# Patient Record
Sex: FEMALE | NOT HISPANIC OR LATINO
[De-identification: names, ages, dates, MRNs, and addresses within clinical notes are randomized per-mention and may not be internally consistent; named-entity substitution may affect disease eponyms.]

---

## 2020-12-21 PROBLEM — Z00.00 ENCOUNTER FOR PREVENTIVE HEALTH EXAMINATION: Status: ACTIVE | Noted: 2020-12-21

## 2020-12-28 ENCOUNTER — RESULT REVIEW (OUTPATIENT)
Age: 50
End: 2020-12-28

## 2020-12-28 ENCOUNTER — APPOINTMENT (OUTPATIENT)
Dept: HEMATOLOGY ONCOLOGY | Facility: CLINIC | Age: 50
End: 2020-12-28
Payer: COMMERCIAL

## 2020-12-28 VITALS
SYSTOLIC BLOOD PRESSURE: 114 MMHG | BODY MASS INDEX: 20.91 KG/M2 | HEART RATE: 16 BPM | OXYGEN SATURATION: 100 % | WEIGHT: 117.99 LBS | HEIGHT: 62.8 IN | RESPIRATION RATE: 16 BRPM | DIASTOLIC BLOOD PRESSURE: 72 MMHG | TEMPERATURE: 98.1 F

## 2020-12-28 DIAGNOSIS — Z78.9 OTHER SPECIFIED HEALTH STATUS: ICD-10-CM

## 2020-12-28 DIAGNOSIS — Z87.891 PERSONAL HISTORY OF NICOTINE DEPENDENCE: ICD-10-CM

## 2020-12-28 PROCEDURE — 99072 ADDL SUPL MATRL&STAF TM PHE: CPT

## 2020-12-28 PROCEDURE — 99205 OFFICE O/P NEW HI 60 MIN: CPT

## 2020-12-28 RX ORDER — CHOLECALCIFEROL (VITAMIN D3) 50 MCG
2000 CAPSULE ORAL
Refills: 0 | Status: ACTIVE | COMMUNITY

## 2020-12-28 RX ORDER — ZINC SULFATE 50(220)MG
50 CAPSULE ORAL
Refills: 0 | Status: ACTIVE | COMMUNITY

## 2020-12-28 NOTE — CONSULT LETTER
[Dear  ___] : Dear  [unfilled], [Consult Letter:] : I had the pleasure of evaluating your patient, [unfilled]. [Please see my note below.] : Please see my note below. [Consult Closing:] : Thank you very much for allowing me to participate in the care of this patient.  If you have any questions, please do not hesitate to contact me. [Sincerely,] : Sincerely, [FreeTextEntry3] : Leonila Rodriguez MD\par [facility] [place] at [facility]\par

## 2020-12-28 NOTE — HISTORY OF PRESENT ILLNESS
[de-identified] : 50 year old female presents today for initial consultation of thrombocytosis, referred by Dr. Umaña.  Platelets dated 10/16/2020 were 619k with a normal H/H.   Follow up labs showed a platelet count of 554.  Patient denies any medical problems or PMH- she is feeling well.  She still has her periods with moderate blood loss.  She denies any potential inflammatory process, no joint pain, rashes, fevers.

## 2020-12-28 NOTE — ASSESSMENT
[FreeTextEntry1] : 51 yo female referred by Dr. Yvette Umaña for evaluation of thrombocythemia.  Patient with elevated platelets count for the last two years with platelets count 561k today.\par I reviewed with patient differential diagnosis of secondary thrombocythemia related to blood loss or inflammatory  process vs essential thrombocythemia.  \par \par Labs were send out for ferritin, ESR, FRANC-2, CALR and MPL mutations.  She was explained that bone marrow biopsy might be needed to complete diagnostic process. \par \par Shanell will follow up in 2 weeks.\par \par Labs next visit: CBC, LDH.

## 2021-02-01 ENCOUNTER — APPOINTMENT (OUTPATIENT)
Dept: HEMATOLOGY ONCOLOGY | Facility: CLINIC | Age: 51
End: 2021-02-01
Payer: COMMERCIAL

## 2021-02-04 ENCOUNTER — APPOINTMENT (OUTPATIENT)
Dept: HEMATOLOGY ONCOLOGY | Facility: CLINIC | Age: 51
End: 2021-02-04
Payer: COMMERCIAL

## 2021-02-04 PROCEDURE — 99214 OFFICE O/P EST MOD 30 MIN: CPT | Mod: 95

## 2021-02-08 NOTE — HISTORY OF PRESENT ILLNESS
[Home] : at home, [unfilled] , at the time of the visit. [Medical Office: ([facility])___] : at the medical office located in  [Verbal consent obtained from patient] : the patient, [unfilled] [de-identified] : 50 year old female presents today for initial consultation of thrombocytosis, referred by Dr. Umaña.  Platelets dated 10/16/2020 were 619k with a normal H/H.   Follow up labs showed a platelet count of 554.  Patient denies any medical problems or PMH- she is feeling well.  She still has her periods with moderate blood loss.  She denies any potential inflammatory process, no joint pain, rashes, fevers.   [de-identified] : Patient is following up to review lab work related to thrombocytosis. Patient denies any physical complaints.

## 2021-02-08 NOTE — CONSULT LETTER
[FreeTextEntry3] : Leonila Rodriguez MD\par [facility] [place] at [facility]\par

## 2021-02-08 NOTE — ASSESSMENT
No [FreeTextEntry1] : 51 yo female referred by Dr. Yvette Umaña for evaluation of thrombocythemia.  Patient with elevated platelets count for the last two years with platelets count 561k today.\par I reviewed with patient differential diagnosis of secondary thrombocythemia related to blood loss or inflammatory  process vs essential thrombocythemia.  \par \par Labs were send out for ferritin, ESR, FRANC-2, CALR and MPL mutations.  She was explained that bone marrow biopsy might be needed to complete diagnostic process. \par \par Labs next visit: CBC, LDH.\par \par 2/4/2021\par Telehealth with patient today.\par \par The results of laboratory testing revealed FRANC-2 V16F 6%.  \par \par Patient with low risk disease given JAK2 positivity, age < 60 and no history of thrombosis. \par \par Reviewed with patient pathobiology of ET, MPD disease, with presentation of thrombocythemia.  SHe is healthy 51 yo, with no history of HTN, DM, CAD or thrombosis and she is a candidate for surveillance.  SHe will start ASA 81 mg.\par Explained complications of ET including thrombosis, CVA and correlation with bone marrow fibrosis.\par \par \par

## 2021-04-14 ENCOUNTER — APPOINTMENT (OUTPATIENT)
Dept: HEMATOLOGY ONCOLOGY | Facility: CLINIC | Age: 51
End: 2021-04-14

## 2021-05-10 ENCOUNTER — APPOINTMENT (OUTPATIENT)
Dept: HEMATOLOGY ONCOLOGY | Facility: CLINIC | Age: 51
End: 2021-05-10
Payer: COMMERCIAL

## 2021-06-21 NOTE — ASSESSMENT
[FreeTextEntry1] : 51 yo female referred by Dr. Yvette Umaña for evaluation of thrombocythemia.  Patient with elevated platelets count for the last two years with platelets count 561k today.\par I reviewed with patient differential diagnosis of secondary thrombocythemia related to blood loss or inflammatory  process vs essential thrombocythemia.  \par \par Labs were send out for ferritin, ESR, FRANC-2, CALR and MPL mutations.  She was explained that bone marrow biopsy might be needed to complete diagnostic process. \par \par Labs next visit: CBC, LDH.\par \par 2/4/2021\par Telehealth with patient today.\par \par The results of laboratory testing revealed FRANC-2 V16F 6%.  \par \par Patient with low risk disease given JAK2 positivity, age < 60 and no history of thrombosis. \par \par Reviewed with patient pathobiology of ET, MPD disease, with presentation of thrombocythemia.  SHe is healthy 51 yo, with no history of HTN, DM, CAD or thrombosis and she is a candidate for surveillance.  SHe will start ASA 81 mg.\par Explained complications of ET including thrombosis, CVA and correlation with bone marrow fibrosis.\par \par \par

## 2021-06-21 NOTE — CONSULT LETTER
[FreeTextEntry3] : Leonila Rodriguez MD\par [facility] [place] at [facility]\par

## 2021-06-21 NOTE — HISTORY OF PRESENT ILLNESS
[de-identified] : 50 year old female presents today for initial consultation of thrombocytosis, referred by Dr. Umaña.  Platelets dated 10/16/2020 were 619k with a normal H/H.   Follow up labs showed a platelet count of 554.  Patient denies any medical problems or PMH- she is feeling well.  She still has her periods with moderate blood loss.  She denies any potential inflammatory process, no joint pain, rashes, fevers.   [de-identified] : Patient is following up to review lab work related to thrombocytosis. Patient denies any physical complaints.

## 2021-06-28 ENCOUNTER — APPOINTMENT (OUTPATIENT)
Dept: HEMATOLOGY ONCOLOGY | Facility: CLINIC | Age: 51
End: 2021-06-28
Payer: COMMERCIAL

## 2021-08-02 ENCOUNTER — RESULT REVIEW (OUTPATIENT)
Age: 51
End: 2021-08-02

## 2021-08-02 ENCOUNTER — APPOINTMENT (OUTPATIENT)
Dept: HEMATOLOGY ONCOLOGY | Facility: CLINIC | Age: 51
End: 2021-08-02
Payer: COMMERCIAL

## 2021-08-02 VITALS
WEIGHT: 124.3 LBS | OXYGEN SATURATION: 100 % | TEMPERATURE: 97.6 F | HEIGHT: 62.8 IN | DIASTOLIC BLOOD PRESSURE: 69 MMHG | BODY MASS INDEX: 22.02 KG/M2 | RESPIRATION RATE: 16 BRPM | SYSTOLIC BLOOD PRESSURE: 101 MMHG | HEART RATE: 61 BPM

## 2021-08-02 PROCEDURE — 99214 OFFICE O/P EST MOD 30 MIN: CPT

## 2021-08-02 RX ORDER — PARSLEY 450 MG
500 CAPSULE ORAL
Refills: 0 | Status: ACTIVE | COMMUNITY

## 2021-08-02 RX ORDER — GINKGO BILOBA 60 MG
TABLET ORAL
Refills: 0 | Status: ACTIVE | COMMUNITY

## 2021-08-02 RX ORDER — CALCIUM CITRATE/VITAMIN D3 500-12.5/5
500-500 POWDER IN PACKET (EA) ORAL
Refills: 0 | Status: ACTIVE | COMMUNITY

## 2021-08-02 RX ORDER — CYANOCOBALAMIN (VITAMIN B-12) 1000 MCG
TABLET ORAL
Refills: 0 | Status: ACTIVE | COMMUNITY

## 2021-08-02 NOTE — HISTORY OF PRESENT ILLNESS
[Home] : at home, [unfilled] , at the time of the visit. [Medical Office: ([facility])___] : at the medical office located in  [Verbal consent obtained from patient] : the patient, [unfilled] [de-identified] : 50 year old female presents today for initial consultation of thrombocytosis, referred by Dr. Umaña.  Platelets dated 10/16/2020 were 619k with a normal H/H.   Follow up labs showed a platelet count of 554.  Patient denies any medical problems or PMH- she is feeling well.  She still has her periods with moderate blood loss.  She denies any potential inflammatory process, no joint pain, rashes, fevers.   [de-identified] : Patient is following up to review lab work related to thrombocytosis. Patient denies any physical complaints. Se did not start ASA- she started Ginko instead

## 2021-08-02 NOTE — ASSESSMENT
[FreeTextEntry1] : 52 yo female referred by Dr. Yvette Umaña for evaluation of thrombocythemia.  Patient with elevated platelets count for the last two years with platelets count 561k today.\par I reviewed with patient differential diagnosis of secondary thrombocythemia related to blood loss or inflammatory  process vs essential thrombocythemia.  \par \par Labs were send out for ferritin, ESR, FRANC-2, CALR and MPL mutations.  She was explained that bone marrow biopsy might be needed to complete diagnostic process. \par \par Labs next visit: CBC, LDH.\par \par 2/4/2021\par Telehealth with patient today.\par \par The results of laboratory testing revealed FRANC-2 V16F 6%.  \par \par Patient with low risk disease given JAK2 positivity, age < 60 and no history of thrombosis. \par \par Reviewed with patient pathobiology of ET, MPD disease, with presentation of thrombocythemia.  SHe is healthy 49 yo, with no history of HTN, DM, CAD or thrombosis and she is a candidate for surveillance.  SHe will start ASA 81 mg.\par Explained complications of ET including thrombosis, CVA and correlation with bone marrow fibrosis.\par \par 8/2/2021\par JAK2 positive ET - platelets 522k\par She is taking Ginko instead of ASA- explained no study to compare ASA to Ginko\par Continue surveillance \par Didnt get vaccine for COVID\par RTC 6 months\par \par \par

## 2021-08-02 NOTE — CONSULT LETTER
[Dear  ___] : Dear  [unfilled], [Consult Letter:] : I had the pleasure of evaluating your patient, [unfilled]. [Please see my note below.] : Please see my note below. [Consult Closing:] : Thank you very much for allowing me to participate in the care of this patient.  If you have any questions, please do not hesitate to contact me. [Sincerely,] : Sincerely, [FreeTextEntry3] : Leonila Rodriguez MD\par [facility] [place] at [facility]\par

## 2022-02-07 ENCOUNTER — APPOINTMENT (OUTPATIENT)
Dept: HEMATOLOGY ONCOLOGY | Facility: CLINIC | Age: 52
End: 2022-02-07
Payer: COMMERCIAL

## 2022-06-06 ENCOUNTER — RESULT REVIEW (OUTPATIENT)
Age: 52
End: 2022-06-06

## 2022-06-06 ENCOUNTER — APPOINTMENT (OUTPATIENT)
Dept: HEMATOLOGY ONCOLOGY | Facility: CLINIC | Age: 52
End: 2022-06-06
Payer: COMMERCIAL

## 2022-06-06 VITALS
DIASTOLIC BLOOD PRESSURE: 72 MMHG | SYSTOLIC BLOOD PRESSURE: 121 MMHG | TEMPERATURE: 97.6 F | HEART RATE: 57 BPM | RESPIRATION RATE: 16 BRPM | OXYGEN SATURATION: 99 % | BODY MASS INDEX: 22.4 KG/M2 | WEIGHT: 126.44 LBS | HEIGHT: 62.8 IN

## 2022-06-06 PROCEDURE — 36415 COLL VENOUS BLD VENIPUNCTURE: CPT

## 2022-06-06 PROCEDURE — 99213 OFFICE O/P EST LOW 20 MIN: CPT | Mod: 25

## 2022-06-06 NOTE — CONSULT LETTER
[FreeTextEntry3] : Leonila Rodriguez MD\par [facility] [place] at [facility]\par

## 2022-06-06 NOTE — ASSESSMENT
[FreeTextEntry1] : 50 yo female referred by Dr. Yvette Umaña for evaluation of thrombocythemia.  Patient with elevated platelets count for the last two years with platelets count 561k today.\par I reviewed with patient differential diagnosis of secondary thrombocythemia related to blood loss or inflammatory  process vs essential thrombocythemia.  \par \par Labs were send out for ferritin, ESR, FRANC-2, CALR and MPL mutations.  She was explained that bone marrow biopsy might be needed to complete diagnostic process. \par \par Labs next visit: CBC, LDH.\par \par The results of laboratory testing revealed FRANC-2 V16F 6%.  \par \par Patient with low risk disease given JAK2 positivity, age < 60 and no history of thrombosis. \par \par Reviewed with patient pathobiology of ET, MPD disease, with presentation of thrombocythemia.  SHe is healthy 51 yo, with no history of HTN, DM, CAD or thrombosis and she is a candidate for surveillance.  mg.\par Explained complications of ET including thrombosis, CVA and correlation with bone marrow fibrosis.\par \par JAK2 positive ET - platelets 522k\par She is taking Ginkgo instead of ASA- explained no study to compare ASA to Ginkgo\par  \par Platelets 513k, doesn’t take ASA, takes Ginkgo, for memory.  \par Not COVID vaccinated, declined.\par Encouraged to take ASA. \par \par C/o hand swelling - left hand, pinky pain, works on the computer.  Follow up with Dr. Umaña\par \par \par \par \par \par

## 2022-06-06 NOTE — HISTORY OF PRESENT ILLNESS
[de-identified] : 50 year old female presents today for initial consultation of thrombocytosis, referred by Dr. Umaña.  Platelets dated 10/16/2020 were 619k with a normal H/H.   Follow up labs showed a platelet count of 554.  Patient denies any medical problems or PMH- she is feeling well.  She still has her periods with moderate blood loss.  She denies any potential inflammatory process, no joint pain, rashes, fevers.   [de-identified] : Patient is following up to review lab work related to thrombocytosis. Patient denies any physical complaints.

## 2022-12-05 ENCOUNTER — APPOINTMENT (OUTPATIENT)
Dept: HEMATOLOGY ONCOLOGY | Facility: CLINIC | Age: 52
End: 2022-12-05
Payer: COMMERCIAL

## 2023-06-06 ENCOUNTER — APPOINTMENT (OUTPATIENT)
Dept: HEMATOLOGY ONCOLOGY | Facility: CLINIC | Age: 53
End: 2023-06-06
Payer: COMMERCIAL

## 2023-06-06 ENCOUNTER — RESULT REVIEW (OUTPATIENT)
Age: 53
End: 2023-06-06

## 2023-06-06 VITALS
DIASTOLIC BLOOD PRESSURE: 80 MMHG | BODY MASS INDEX: 22.02 KG/M2 | HEART RATE: 67 BPM | WEIGHT: 124.25 LBS | OXYGEN SATURATION: 99 % | SYSTOLIC BLOOD PRESSURE: 120 MMHG | TEMPERATURE: 97.3 F | HEIGHT: 62.8 IN | RESPIRATION RATE: 16 BRPM
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
## 2023-06-06 PROCEDURE — 36415 COLL VENOUS BLD VENIPUNCTURE: CPT

## 2023-06-06 PROCEDURE — 99214 OFFICE O/P EST MOD 30 MIN: CPT | Mod: 25

## 2023-06-06 NOTE — HISTORY OF PRESENT ILLNESS
[de-identified] : 50 year old female presents today for initial consultation of thrombocytosis, referred by Dr. Umaña.  Platelets dated 10/16/2020 were 619k with a normal H/H.   Follow up labs showed a platelet count of 554.  Patient denies any medical problems or PMH- she is feeling well.  She still has her periods with moderate blood loss.  She denies any potential inflammatory process, no joint pain, rashes, fevers.   [de-identified] : Patient is following up to review lab work related to thrombocytosis. Patient denies any physical complaints.

## 2023-06-06 NOTE — ASSESSMENT
[FreeTextEntry1] : 53 yo female referred by Dr. Yvette Umaña for evaluation of thrombocythemia.  Patient with elevated platelets count for the last two years with platelets count 561k today.\par I reviewed with patient differential diagnosis of secondary thrombocythemia related to blood loss or inflammatory  process vs essential thrombocythemia.  \par \par Labs were send out for ferritin, ESR, FRANC-2, CALR and MPL mutations.  She was explained that bone marrow biopsy might be needed to complete diagnostic process. \par \par Labs next visit: CBC, LDH.\par \par The results of laboratory testing revealed FRANC-2 V16F 6%.  \par \par Patient with low risk disease given JAK2 positivity, age < 60 and no history of thrombosis. \par \par Reviewed with patient pathobiology of ET, MPD disease, with presentation of thrombocythemia.  SHe is healthy 53 yo, with no history of HTN, DM, CAD or thrombosis and she is a candidate for surveillance.  \par Explained complications of ET including thrombosis, CVA and correlation with bone marrow fibrosis.\par \par JAK2 positive ET \par She is taking Ginkgo instead of ASA- explained no study to compare ASA to Ginkgo\par  \par Platelets 448k, doesn’t take ASA, takes Ginkgo, for memory.  \par Not COVID vaccinated, declined.\par Encouraged to take ASA. \par \par \par \par \par \par \par \par

## 2023-06-06 NOTE — CONSULT LETTER
[FreeTextEntry3] : Leonila Rodriguez MD\par [facility] [place] at [facility]\par

## 2024-06-13 ENCOUNTER — RESULT REVIEW (OUTPATIENT)
Age: 54
End: 2024-06-13

## 2024-06-13 ENCOUNTER — APPOINTMENT (OUTPATIENT)
Dept: HEMATOLOGY ONCOLOGY | Facility: CLINIC | Age: 54
End: 2024-06-13
Payer: COMMERCIAL

## 2024-06-13 VITALS
SYSTOLIC BLOOD PRESSURE: 111 MMHG | WEIGHT: 117.44 LBS | RESPIRATION RATE: 16 BRPM | DIASTOLIC BLOOD PRESSURE: 74 MMHG | HEART RATE: 73 BPM | HEIGHT: 62.8 IN | BODY MASS INDEX: 20.81 KG/M2 | TEMPERATURE: 96.5 F | OXYGEN SATURATION: 99 %

## 2024-06-13 DIAGNOSIS — D75.839 THROMBOCYTOSIS, UNSPECIFIED: ICD-10-CM

## 2024-06-13 PROCEDURE — 99213 OFFICE O/P EST LOW 20 MIN: CPT | Mod: 25

## 2024-06-13 PROCEDURE — 36415 COLL VENOUS BLD VENIPUNCTURE: CPT

## 2024-06-13 NOTE — ASSESSMENT
[FreeTextEntry1] : 52 yo female referred by Dr. Yvette Umaña for evaluation of thrombocythemia.  Patient with elevated platelets count for the last two years with platelets count 561k today. I reviewed with patient differential diagnosis of secondary thrombocythemia related to blood loss or inflammatory  process vs essential thrombocythemia.    Labs were send out for ferritin, ESR, FRANC-2, CALR and MPL mutations.  She was explained that bone marrow biopsy might be needed to complete diagnostic process.   Labs next visit: CBC, LDH.  The results of laboratory testing revealed FRANC-2 V16F 6%.    Patient with low risk disease given JAK2 positivity, age < 60 and no history of thrombosis.   Reviewed with patient pathobiology of ET, MPD disease, with presentation of thrombocythemia.  SHe is healthy 53 yo, with no history of HTN, DM, CAD or thrombosis and she is a candidate for surveillance.   Explained complications of ET including thrombosis, CVA and correlation with bone marrow fibrosis.  JAK2 positive ET  She is taking Ginkgo instead of ASA- explained no study to compare ASA to Ginkgo   Platelets 458k, doesn't take ASA, takes Ginkgo, for memory.   Not COVID vaccinated, declined. Encouraged to take ASA.   Case and mgmt discussed with Dr. Rodriguez- return in 1 year- reg labs and irons

## 2024-06-13 NOTE — HISTORY OF PRESENT ILLNESS
[de-identified] : 50 year old female presents today for initial consultation of thrombocytosis, referred by Dr. Umaña.  Platelets dated 10/16/2020 were 619k with a normal H/H.   Follow up labs showed a platelet count of 554.  Patient denies any medical problems or PMH- she is feeling well.  She still has her periods with moderate blood loss.  She denies any potential inflammatory process, no joint pain, rashes, fevers.   [de-identified] : Patient is following up to review lab work related to thrombocytosis. Patient has increased UTI's has f/u apt after today's visit.

## 2024-06-13 NOTE — CONSULT LETTER
[Dear  ___] : Dear  [unfilled], [Consult Letter:] : I had the pleasure of evaluating your patient, [unfilled]. [Please see my note below.] : Please see my note below. [Consult Closing:] : Thank you very much for allowing me to participate in the care of this patient.  If you have any questions, please do not hesitate to contact me. [Sincerely,] : Sincerely, [FreeTextEntry3] : Leonila Rodriguez MD\par  [facility] [place] at [facility]\par

## 2025-06-05 ENCOUNTER — APPOINTMENT (OUTPATIENT)
Dept: HEMATOLOGY ONCOLOGY | Facility: CLINIC | Age: 55
End: 2025-06-05